# Patient Record
Sex: MALE | Race: BLACK OR AFRICAN AMERICAN | NOT HISPANIC OR LATINO | URBAN - METROPOLITAN AREA
[De-identification: names, ages, dates, MRNs, and addresses within clinical notes are randomized per-mention and may not be internally consistent; named-entity substitution may affect disease eponyms.]

---

## 2017-10-01 ENCOUNTER — OUTPATIENT (OUTPATIENT)
Dept: OUTPATIENT SERVICES | Facility: HOSPITAL | Age: 32
LOS: 1 days | End: 2017-10-01
Payer: MEDICAID

## 2017-10-01 PROCEDURE — G9001: CPT

## 2017-10-13 DIAGNOSIS — R69 ILLNESS, UNSPECIFIED: ICD-10-CM

## 2019-06-20 ENCOUNTER — APPOINTMENT (OUTPATIENT)
Dept: ORTHOPEDIC SURGERY | Facility: CLINIC | Age: 34
End: 2019-06-20
Payer: COMMERCIAL

## 2019-06-20 VITALS
HEART RATE: 98 BPM | WEIGHT: 280 LBS | BODY MASS INDEX: 35.94 KG/M2 | HEIGHT: 74 IN | SYSTOLIC BLOOD PRESSURE: 156 MMHG | DIASTOLIC BLOOD PRESSURE: 102 MMHG

## 2019-06-20 DIAGNOSIS — Z78.9 OTHER SPECIFIED HEALTH STATUS: ICD-10-CM

## 2019-06-20 DIAGNOSIS — F17.200 NICOTINE DEPENDENCE, UNSPECIFIED, UNCOMPLICATED: ICD-10-CM

## 2019-06-20 DIAGNOSIS — M54.5 LOW BACK PAIN: ICD-10-CM

## 2019-06-20 PROCEDURE — 72100 X-RAY EXAM L-S SPINE 2/3 VWS: CPT

## 2019-06-20 PROCEDURE — 99203 OFFICE O/P NEW LOW 30 MIN: CPT

## 2019-06-20 RX ORDER — IBUPROFEN 600 MG/1
600 TABLET, FILM COATED ORAL 3 TIMES DAILY
Qty: 60 | Refills: 0 | Status: ACTIVE | COMMUNITY
Start: 2019-06-20 | End: 1900-01-01

## 2019-06-20 RX ORDER — METHYLPREDNISOLONE 4 MG/1
4 TABLET ORAL
Qty: 1 | Refills: 0 | Status: ACTIVE | COMMUNITY
Start: 2019-06-20 | End: 1900-01-01

## 2019-06-20 NOTE — HISTORY OF PRESENT ILLNESS
[Pain Location] : pain [de-identified] : The patient is a 34-year-old male who comes in complaining of left lower back pain for the past month. Patient states pain started after trying to move furniture. Patient was seen at urgent care where he was given naproxen and Flexeril. Patient states following week he was DOA was given hydrocodone and an injection for the pain. Patient states pain is intermittent and does increase in severity at times. Patient denies any radiculopathy of pain. Patient denies any weakness saddle anesthesia or bladder dysfunction

## 2019-06-20 NOTE — DISCUSSION/SUMMARY
[de-identified] : Discussion had with patient \par Patient will follow up with PMR \par Patient aware must follow up with MD for further eval and treatment \par Patient will start Physical Therapy \par Patients questions were answered and patient is satisfied with today's visit\par

## 2019-06-20 NOTE — PHYSICAL EXAM
[UE/LE] : Sensory: Intact in bilateral upper & lower extremities [ALL] : dorsalis pedis, posterior tibial, femoral, popliteal, and radial 2+ and symmetric bilaterally [Poor Appearance] : well-appearing [Acute Distress] : not in acute distress [Normal] : Oriented to person, place, and time, insight and judgement were intact and the affect was normal [de-identified] : Gait Steady \par \par Spine \par positive tenderness to left paraspinal muscles, No bony tenderness, no step offs\par \par \par Lumbar ROM\par pain with flexion\par \par Lower Extremities Reflexes 2 +, \par Strength \par Hip flexion 5/5\par Knee Extension 5/5\par Dorsi/Plantar flexion 5/5\par EHL 5/5\par Clonus Negative \par Babinski Negative \par SLR - Negative \par Sensation intact and equal to light touch bilaterally\par Distal Pulses intact\par \par  [de-identified] : 2 view ls spine straightening of spine

## 2019-06-24 PROBLEM — M54.5 ACUTE LEFT-SIDED LOW BACK PAIN WITHOUT SCIATICA: Status: ACTIVE | Noted: 2019-06-20

## 2023-11-18 ENCOUNTER — NON-APPOINTMENT (OUTPATIENT)
Age: 38
End: 2023-11-18